# Patient Record
Sex: FEMALE | Race: WHITE | Employment: FULL TIME | URBAN - METROPOLITAN AREA
[De-identification: names, ages, dates, MRNs, and addresses within clinical notes are randomized per-mention and may not be internally consistent; named-entity substitution may affect disease eponyms.]

---

## 2017-06-08 PROBLEM — M79.671 RIGHT FOOT PAIN: Status: ACTIVE | Noted: 2017-06-08

## 2017-09-18 PROBLEM — J30.1 CHRONIC ALLERGIC RHINITIS DUE TO POLLEN, UNSPECIFIED SEASONALITY: Status: ACTIVE | Noted: 2017-09-18

## 2018-02-22 PROBLEM — Z01.419 ENCOUNTER FOR WELL WOMAN EXAM: Status: ACTIVE | Noted: 2018-02-22

## 2018-02-22 PROCEDURE — 87660 TRICHOMONAS VAGIN DIR PROBE: CPT | Performed by: OBSTETRICS & GYNECOLOGY

## 2018-02-22 PROCEDURE — 87510 GARDNER VAG DNA DIR PROBE: CPT | Performed by: OBSTETRICS & GYNECOLOGY

## 2018-02-22 PROCEDURE — 88175 CYTOPATH C/V AUTO FLUID REDO: CPT | Performed by: OBSTETRICS & GYNECOLOGY

## 2018-02-22 PROCEDURE — 87480 CANDIDA DNA DIR PROBE: CPT | Performed by: OBSTETRICS & GYNECOLOGY

## 2018-02-22 PROCEDURE — 87624 HPV HI-RISK TYP POOLED RSLT: CPT | Performed by: OBSTETRICS & GYNECOLOGY

## 2018-02-22 PROCEDURE — 88304 TISSUE EXAM BY PATHOLOGIST: CPT | Performed by: OBSTETRICS & GYNECOLOGY

## 2018-02-25 ENCOUNTER — HOSPITAL ENCOUNTER (EMERGENCY)
Age: 28
Discharge: HOME | End: 2018-02-25
Attending: EMERGENCY MEDICINE
Payer: COMMERCIAL

## 2018-02-25 VITALS
HEIGHT: 69 IN | OXYGEN SATURATION: 99 % | WEIGHT: 155 LBS | RESPIRATION RATE: 20 BRPM | HEART RATE: 99 BPM | BODY MASS INDEX: 22.96 KG/M2 | DIASTOLIC BLOOD PRESSURE: 76 MMHG | SYSTOLIC BLOOD PRESSURE: 128 MMHG | TEMPERATURE: 100 F

## 2018-02-25 DIAGNOSIS — Z48.89 ENCOUNTER FOR POST SURGICAL WOUND CHECK: Primary | ICD-10-CM

## 2018-02-25 PROCEDURE — 99283 EMERGENCY DEPT VISIT LOW MDM: CPT

## 2018-02-25 NOTE — ED PROVIDER NOTES
Patient Seen in: [facility] Emergency Department In [place]    History   Patient presents with:  Eval-G (gynecologic)    Stated Complaint: eval g    HPI    46-year-old female comes the hospital she complained having discomfort over her labia majora.   She cyanosis or edema noted. Full range of motion noted without tenderness  Genitalia postsurgical healing granulating tissue noted without erythema, pus or drainage.   Is no significant tenderness at this time there is no sign of infection    ED Course   Labs

## 2018-02-25 NOTE — ED INITIAL ASSESSMENT (HPI)
Skin tags removed from labia on Thursday. Yesterday with pain to site with redness and bleeding. Pt concerned for infection. On flagly post-procedure. Denies fevers.

## 2018-07-18 PROCEDURE — 81003 URINALYSIS AUTO W/O SCOPE: CPT | Performed by: FAMILY MEDICINE

## 2018-07-18 PROCEDURE — 87329 GIARDIA AG IA: CPT | Performed by: FAMILY MEDICINE

## 2018-07-18 PROCEDURE — 87046 STOOL CULTR AEROBIC BACT EA: CPT | Performed by: FAMILY MEDICINE

## 2018-07-18 PROCEDURE — 87209 SMEAR COMPLEX STAIN: CPT | Performed by: FAMILY MEDICINE

## 2018-07-18 PROCEDURE — 87045 FECES CULTURE AEROBIC BACT: CPT | Performed by: FAMILY MEDICINE

## 2018-07-18 PROCEDURE — 87272 CRYPTOSPORIDIUM AG IF: CPT | Performed by: FAMILY MEDICINE

## 2018-07-18 PROCEDURE — 87077 CULTURE AEROBIC IDENTIFY: CPT | Performed by: FAMILY MEDICINE

## 2018-07-18 PROCEDURE — 87177 OVA AND PARASITES SMEARS: CPT | Performed by: FAMILY MEDICINE

## 2018-07-24 PROBLEM — K62.5 RECTAL BLEEDING: Status: ACTIVE | Noted: 2018-07-24

## 2018-10-22 ENCOUNTER — HOSPITAL ENCOUNTER (OUTPATIENT)
Facility: HOSPITAL | Age: 28
Setting detail: HOSPITAL OUTPATIENT SURGERY
Discharge: HOME | End: 2018-10-22
Attending: INTERNAL MEDICINE | Admitting: INTERNAL MEDICINE
Payer: COMMERCIAL

## 2018-10-22 ENCOUNTER — ANESTHESIA EVENT (OUTPATIENT)
Dept: ENDOSCOPY | Facility: HOSPITAL | Age: 28
End: 2018-10-22
Payer: COMMERCIAL

## 2018-10-22 ENCOUNTER — ANESTHESIA (OUTPATIENT)
Dept: ENDOSCOPY | Facility: HOSPITAL | Age: 28
End: 2018-10-22
Payer: COMMERCIAL

## 2018-10-22 VITALS
HEART RATE: 60 BPM | HEIGHT: 69 IN | TEMPERATURE: 98 F | WEIGHT: 143 LBS | RESPIRATION RATE: 16 BRPM | SYSTOLIC BLOOD PRESSURE: 118 MMHG | OXYGEN SATURATION: 100 % | DIASTOLIC BLOOD PRESSURE: 89 MMHG | BODY MASS INDEX: 21.18 KG/M2

## 2018-10-22 PROCEDURE — 0DB98ZX EXCISION OF DUODENUM, VIA NATURAL OR ARTIFICIAL OPENING ENDOSCOPIC, DIAGNOSTIC: ICD-10-PCS | Performed by: INTERNAL MEDICINE

## 2018-10-22 PROCEDURE — 81025 URINE PREGNANCY TEST: CPT | Performed by: INTERNAL MEDICINE

## 2018-10-22 PROCEDURE — 0DB68ZX EXCISION OF STOMACH, VIA NATURAL OR ARTIFICIAL OPENING ENDOSCOPIC, DIAGNOSTIC: ICD-10-PCS | Performed by: INTERNAL MEDICINE

## 2018-10-22 PROCEDURE — 88305 TISSUE EXAM BY PATHOLOGIST: CPT | Performed by: INTERNAL MEDICINE

## 2018-10-22 PROCEDURE — 0DBN8ZZ EXCISION OF SIGMOID COLON, VIA NATURAL OR ARTIFICIAL OPENING ENDOSCOPIC: ICD-10-PCS | Performed by: INTERNAL MEDICINE

## 2018-10-22 RX ORDER — SODIUM CHLORIDE, SODIUM LACTATE, POTASSIUM CHLORIDE, CALCIUM CHLORIDE 600; 310; 30; 20 MG/100ML; MG/100ML; MG/100ML; MG/100ML
INJECTION, SOLUTION INTRAVENOUS CONTINUOUS
Status: DISCONTINUED | OUTPATIENT
Start: 2018-10-22 | End: 2018-10-22

## 2018-10-22 RX ORDER — SODIUM CHLORIDE, SODIUM LACTATE, POTASSIUM CHLORIDE, CALCIUM CHLORIDE 600; 310; 30; 20 MG/100ML; MG/100ML; MG/100ML; MG/100ML
INJECTION, SOLUTION INTRAVENOUS CONTINUOUS
Status: CANCELLED | OUTPATIENT
Start: 2018-10-22

## 2018-10-22 RX ORDER — NALOXONE HYDROCHLORIDE 0.4 MG/ML
80 INJECTION, SOLUTION INTRAMUSCULAR; INTRAVENOUS; SUBCUTANEOUS AS NEEDED
Status: CANCELLED | OUTPATIENT
Start: 2018-10-22 | End: 2018-10-23

## 2018-10-22 NOTE — ANESTHESIA PREPROCEDURE EVALUATION
PRE-OP EVALUATION    Patient Name: Saturnino Talbot    Pre-op Diagnosis: ABDOMINAL PAIN,IRON DEFICIENCY ANEMIA,RECTAL BLEEDING    Procedure(s):  ESOPHAGOGASTRODUODENOSCOPY AND COLONOSCOPY      Surgeon(s) and Role:     * Derrick Perry MD - Primary    P patient. Risks/benefits explained. Agrees to proceed. Consent signed.   Plan/risks discussed with: patient                Present on Admission:  **None**

## 2018-10-22 NOTE — ANESTHESIA POSTPROCEDURE EVALUATION
Aileen [address] Patient Status:  Hospital Outpatient Surgery   Age/Gender 34year old female MRN [id number]   Location [facility] Attending Rea Márquez MD   Hosp Day # 0 PCP Chapis Vera MD       Anesthesia Post-

## 2018-10-22 NOTE — H&P
HISTORY AND PHYSICAL FOR ENDOSCOPIC PROCEDURE      Jarad Beckford Patient Status:  Hospital Outpatient Surgery    10/8/1990 MRN [id number]   [facility] ENDOSCOPY Attending Wong Everett MD   Hosp Day # 0 PCP Arti Zimmerman MD history of stroke, recent passing out, recent dizziness, convulsions/seizures, dementia.   Cardiovascular: Denies history of heart murmur, leg swelling, history of rheumatic fever, pacemaker, chest pain or pressure after eating or when upset, automatic defi No neck vein distention. Thyroid not enlarged. No lymphadenopathy. CV: S1 and S2 normal.  No murmurs or gallops. Lungs: Clear to auscultation. Abdomen: Soft and nondistended. Nontender. No masses. Bowel sounds are present. Back: No CVA tenderness. alternatives were discussed with the patient. The patient indicates understanding of these issues and agrees to proceed with the colonoscopy. The patient is an ASA grade 2.       Deidre Luong  10/22/2018  4:18 PM

## 2018-10-25 NOTE — PROGRESS NOTES
10/25/2018  Colt Austinfilippo  [address] [facility] [id number]    Dear Tae Monson,       Here are the biopsy/pathology findings from your recent EGD (upper endoscopy) and colonoscopy:     The biopsy/pathology findings from your upper endoscopy showed:

## 2019-01-21 PROBLEM — K62.5 RECTAL BLEEDING: Status: RESOLVED | Noted: 2018-07-24 | Resolved: 2019-01-21

## 2019-01-21 PROBLEM — R00.2 PALPITATIONS: Status: ACTIVE | Noted: 2019-01-21

## 2019-01-21 PROBLEM — M79.671 RIGHT FOOT PAIN: Status: RESOLVED | Noted: 2017-06-08 | Resolved: 2019-01-21

## 2019-01-21 PROBLEM — R42 DIZZINESS: Status: ACTIVE | Noted: 2019-01-21

## 2019-03-19 PROBLEM — J30.1 CHRONIC ALLERGIC RHINITIS DUE TO POLLEN: Status: ACTIVE | Noted: 2017-09-18

## 2019-03-19 PROBLEM — E61.1 IRON DEFICIENCY: Status: ACTIVE | Noted: 2019-03-19

## 2019-03-19 PROBLEM — E55.9 VITAMIN D DEFICIENCY: Status: ACTIVE | Noted: 2019-03-19

## 2019-03-19 PROBLEM — F41.9 ANXIETY: Status: ACTIVE | Noted: 2019-03-19

## 2019-04-11 PROBLEM — R26.89 IMBALANCE: Status: ACTIVE | Noted: 2019-04-11

## 2019-04-16 PROBLEM — F41.1 GAD (GENERALIZED ANXIETY DISORDER): Status: ACTIVE | Noted: 2019-04-16

## 2019-04-16 PROBLEM — R26.89 IMBALANCE: Status: RESOLVED | Noted: 2019-04-11 | Resolved: 2019-04-16

## 2019-04-16 PROBLEM — E23.6 EMPTY SELLA SYNDROME (HCC): Status: ACTIVE | Noted: 2019-04-16

## 2019-04-16 PROBLEM — R51.9 HEADACHE DISORDER: Status: ACTIVE | Noted: 2019-04-16

## 2019-05-07 PROCEDURE — 86803 HEPATITIS C AB TEST: CPT | Performed by: OBSTETRICS & GYNECOLOGY

## 2019-05-07 PROCEDURE — 87389 HIV-1 AG W/HIV-1&-2 AB AG IA: CPT | Performed by: OBSTETRICS & GYNECOLOGY

## 2019-05-07 PROCEDURE — 36415 COLL VENOUS BLD VENIPUNCTURE: CPT | Performed by: OBSTETRICS & GYNECOLOGY

## 2019-05-07 PROCEDURE — 88175 CYTOPATH C/V AUTO FLUID REDO: CPT | Performed by: OBSTETRICS & GYNECOLOGY

## 2019-06-03 PROBLEM — R00.2 PALPITATIONS: Status: RESOLVED | Noted: 2019-01-21 | Resolved: 2019-06-03

## 2019-07-03 PROBLEM — R42 DIZZINESS: Status: RESOLVED | Noted: 2019-01-21 | Resolved: 2019-07-03

## 2019-07-15 PROBLEM — F50.9 EATING DISORDER, UNSPECIFIED: Status: ACTIVE | Noted: 2019-07-15

## 2019-07-15 PROBLEM — F43.10 PTSD (POST-TRAUMATIC STRESS DISORDER): Status: ACTIVE | Noted: 2019-07-15

## 2019-07-15 PROBLEM — F33.2 SEVERE RECURRENT MAJOR DEPRESSION WITHOUT PSYCHOTIC FEATURES (HCC): Status: ACTIVE | Noted: 2019-07-15

## 2019-07-18 ENCOUNTER — LAB ENCOUNTER (OUTPATIENT)
Dept: LAB | Age: 29
End: 2019-07-18
Attending: Other
Payer: COMMERCIAL

## 2019-07-18 DIAGNOSIS — Z00.00 EXAMINATION: Primary | ICD-10-CM

## 2019-07-18 PROCEDURE — 82150 ASSAY OF AMYLASE: CPT

## 2019-07-18 PROCEDURE — 86803 HEPATITIS C AB TEST: CPT | Performed by: OBSTETRICS & GYNECOLOGY

## 2019-07-18 PROCEDURE — 87389 HIV-1 AG W/HIV-1&-2 AB AG IA: CPT | Performed by: OBSTETRICS & GYNECOLOGY

## 2019-07-18 PROCEDURE — 80053 COMPREHEN METABOLIC PANEL: CPT

## 2019-07-18 PROCEDURE — 84443 ASSAY THYROID STIM HORMONE: CPT

## 2019-07-18 PROCEDURE — 84480 ASSAY TRIIODOTHYRONINE (T3): CPT

## 2019-07-18 PROCEDURE — 83735 ASSAY OF MAGNESIUM: CPT

## 2019-07-18 PROCEDURE — 84436 ASSAY OF TOTAL THYROXINE: CPT

## 2019-07-18 PROCEDURE — 84100 ASSAY OF PHOSPHORUS: CPT

## 2019-07-19 LAB
ALBUMIN SERPL-MCNC: 3.9 G/DL (ref 3.4–5)
ALBUMIN/GLOB SERPL: 1 {RATIO} (ref 1–2)
ALP LIVER SERPL-CCNC: 50 U/L (ref 37–98)
ALT SERPL-CCNC: 29 U/L (ref 13–56)
AMYLASE SERPL-CCNC: 107 U/L (ref 25–115)
ANION GAP SERPL CALC-SCNC: 6 MMOL/L (ref 0–18)
AST SERPL-CCNC: 21 U/L (ref 15–37)
BILIRUB SERPL-MCNC: 0.3 MG/DL (ref 0.1–2)
BUN BLD-MCNC: 17 MG/DL (ref 7–18)
BUN/CREAT SERPL: 18.7 (ref 10–20)
CALCIUM BLD-MCNC: 9.6 MG/DL (ref 8.5–10.1)
CHLORIDE SERPL-SCNC: 105 MMOL/L (ref 98–112)
CO2 SERPL-SCNC: 30 MMOL/L (ref 21–32)
CREAT BLD-MCNC: 0.91 MG/DL (ref 0.55–1.02)
GLOBULIN PLAS-MCNC: 3.9 G/DL (ref 2.8–4.4)
GLUCOSE BLD-MCNC: 72 MG/DL (ref 70–99)
HAV IGM SER QL: 2.2 MG/DL (ref 1.6–2.6)
M PROTEIN MFR SERPL ELPH: 7.8 G/DL (ref 6.4–8.2)
OSMOLALITY SERPL CALC.SUM OF ELEC: 292 MOSM/KG (ref 275–295)
PHOSPHATE SERPL-MCNC: 3.6 MG/DL (ref 2.5–4.9)
POTASSIUM SERPL-SCNC: 3.9 MMOL/L (ref 3.5–5.1)
SODIUM SERPL-SCNC: 141 MMOL/L (ref 136–145)
T3 SERPL-MCNC: 100 NG/DL (ref 60–181)
T4 SERPL-MCNC: 12.1 UG/DL (ref 4.8–13.9)
TSI SER-ACNC: 3.36 MIU/ML (ref 0.36–3.74)

## 2019-10-16 PROBLEM — F33.9 MAJOR DEPRESSION, RECURRENT (HCC): Status: ACTIVE | Noted: 2019-10-16

## 2020-07-28 PROBLEM — Z30.8 ENCOUNTER FOR OTHER CONTRACEPTIVE MANAGEMENT: Status: ACTIVE | Noted: 2020-07-28

## 2020-07-28 PROBLEM — S32.10XA CLOSED FRACTURE OF SACRUM AND COCCYX, INITIAL ENCOUNTER (HCC): Status: ACTIVE | Noted: 2020-07-28

## 2020-07-28 PROBLEM — S32.2XXA CLOSED FRACTURE OF SACRUM AND COCCYX, INITIAL ENCOUNTER (HCC): Status: ACTIVE | Noted: 2020-07-28

## 2020-08-18 PROBLEM — M25.512 ACUTE PAIN OF LEFT SHOULDER: Status: ACTIVE | Noted: 2020-08-18

## 2020-08-18 PROBLEM — F33.2 SEVERE RECURRENT MAJOR DEPRESSION WITHOUT PSYCHOTIC FEATURES (HCC): Status: RESOLVED | Noted: 2019-07-15 | Resolved: 2020-08-18

## 2020-09-11 PROCEDURE — 88302 TISSUE EXAM BY PATHOLOGIST: CPT | Performed by: OBSTETRICS & GYNECOLOGY

## 2021-04-09 DIAGNOSIS — Z23 NEED FOR VACCINATION: ICD-10-CM

## 2021-12-21 ENCOUNTER — TELEPHONE (OUTPATIENT)
Dept: FAMILY MEDICINE CLINIC | Facility: CLINIC | Age: 31
End: 2021-12-21

## 2022-01-21 PROBLEM — R09.81 SINUS CONGESTION: Status: ACTIVE | Noted: 2022-01-21

## 2022-01-21 PROBLEM — R06.02 SOB (SHORTNESS OF BREATH) ON EXERTION: Status: ACTIVE | Noted: 2022-01-21

## 2022-01-21 PROBLEM — H04.203 WATERY EYES: Status: ACTIVE | Noted: 2022-01-21

## 2022-05-04 NOTE — OPERATIVE REPORT
[facility]                                                                                           EGD/Colonoscopy Operative Report    Fernanda Maradiaga Patient Status:  Yusra Tellez [facility]        NAME: [place] is a 60 y o  female  : 1958    MRN: [id number]  DATE: May 4, 2022  TIME: 12:07 PM    Assessment and Plan   Ear congestion, left [H93 8X2]  1  Ear congestion, left  fluticasone (FLONASE) 50 mcg/act nasal spray   2  Acute otitis externa of right ear, unspecified type  neomycin-polymyxin-hydrocortisone (CORTISPORIN) otic solution         Patient Instructions       Follow up with PCP in 3-5 days  Proceed to  ER if symptoms worsen or for any fevers or swelling behind the ear     Chief Complaint     Chief Complaint   Patient presents with    Earache     Right ear pain; left ear clogged; started last night  not taking OTC         History of Present Illness       Patient is a 59 yo female who presents with a couple of days of left ear congestion and right ear pain  She reports some associated diminished hearing in the left ear  She denies drainage from the right eat and swelling behind either ear  She also denies fevers and any nasal congestion or cough  Review of Systems   Review of Systems   Constitutional: Negative for chills and fever  HENT: Positive for ear pain and hearing loss  Negative for congestion, ear discharge, facial swelling and tinnitus  Respiratory: Negative for shortness of breath  Cardiovascular: Negative for chest pain           Current Medications       Current Outpatient Medications:     ascorbic acid (VITAMIN C) 250 mg tablet, Take 250 mg by mouth daily, Disp: , Rfl:     aspirin (ECOTRIN LOW STRENGTH) 81 mg EC tablet, Take 81 mg by mouth daily, Disp: , Rfl:     Vitamin D, Cholecalciferol, 400 units TABS, Take by mouth, Disp: , Rfl:     Zinc Sulfate (ZINC 15 PO), Take by mouth, Disp: , Rfl:     Aloe Vera 25 MG CAPS, Take 50 mg by mouth (Patient not taking: Reported on 2021), Disp: , Rfl:     B COMPLEX-BIOTIN-FA ER PO, Take by mouth (Patient not taking: Reported on 2021), Disp: , Rfl:     Calcium Citrate 200 MG TABS, Take 400 mg by mouth (Patient not taking: Reported on 9/23/2021), Disp: , Rfl:     Chromium Picolinate 200 MCG CAPS, Take by mouth (Patient not taking: Reported on 9/23/2021), Disp: , Rfl:     Cinnamon Oil OIL, by Does not apply route (Patient not taking: Reported on 9/23/2021), Disp: , Rfl:     cyanocobalamin (VITAMIN B-12) 1,000 mcg tablet, Take 1,000 mcg by mouth (Patient not taking: Reported on 9/23/2021), Disp: , Rfl:     fluticasone (FLONASE) 50 mcg/act nasal spray, 1 spray into each nostril daily, Disp: 11 1 mL, Rfl: 0    Ginkgo Biloba 40 MG TABS, Take by mouth (Patient not taking: Reported on 9/23/2021), Disp: , Rfl:     glucosamine-chondroitin 500-400 MG tablet, Take 1 tablet by mouth 3 (three) times a day (Patient not taking: Reported on 9/23/2021), Disp: , Rfl:     methocarbamol (ROBAXIN) 500 mg tablet, Take 1 tablet (500 mg total) by mouth 4 (four) times a day (Patient not taking: Reported on 9/23/2021), Disp: 40 tablet, Rfl: 0    neomycin-polymyxin-hydrocortisone (CORTISPORIN) otic solution, Administer 4 drops to the right ear every 6 (six) hours, Disp: 10 mL, Rfl: 0    NON FORMULARY, , Disp: , Rfl:     nystatin (MYCOSTATIN) 500,000 units/5 mL suspension, Apply 5 mL (500,000 Units total) to the mouth or throat 4 (four) times a day (Patient not taking: Reported on 9/23/2021), Disp: 280 mL, Rfl: 1    patient supplied medication, , Disp: , Rfl:     patient supplied medication, , Disp: , Rfl:     patient supplied medication, , Disp: , Rfl:     Riboflavin (B2 PO), Take by mouth (Patient not taking: Reported on 9/23/2021), Disp: , Rfl:     TURMERIC PO, Take by mouth (Patient not taking: Reported on 9/23/2021), Disp: , Rfl:     Current Allergies     Allergies as of 05/04/2022 - Reviewed 05/04/2022   Allergen Reaction Noted    Albuterol  11/15/2018    Bee venom Anaphylaxis 05/07/2018    Penicillins Anaphylaxis 11/15/2018            The following portions of the patient's history were reviewed withdrawn to examine the esophagus. The endoscope was then removed from the patient. The patient tolerated the procedure well with no immediate complications. The patient was then repositioned for colonoscopy.   After careful digital rectal examination, the and updated as appropriate: allergies, current medications, past family history, past medical history, past social history, past surgical history and problem list      Past Medical History:   Diagnosis Date    Abnormal Pap smear of cervix     Cancer ([facility] [address] ) 12/2018    oral    Hearing loss     down 50% in right ear    Kidney stone     6 or 7 yrs ago    Lumbar herniated disc     Migraine     Neck pain     Prediabetes     Primary squamous cell carcinoma of tongue ([facility] [address] ) 3/8/2019    Varicella     Wears glasses        Past Surgical History:   Procedure Laterality Date    HYSTERECTOMY  2007    OOPHORECTOMY Bilateral 2007    AR EXCIS TONGUE LESN N/A 1/9/2019    Procedure: Partial glossectomy with FROZEN SECTION;  Surgeon: Doron Hughes DO;  Location: [facility];  Service: ENT    AR EXCIS TONGUE LESN Right 3/8/2019    Procedure: LATERAL TONGUE SURGICAL SITE RE-EXCISION WITH FROZEN SECTION ;  Surgeon: Doron Hughes DO;  Location[facility];  Service: ENT    AR REMOVAL NODES, NECK,CERV MOD RAD Bilateral 3/8/2019    Procedure: SUPRAOMOHYOID NECK DISSECTION;  Surgeon: Doron Hughes DO;  Location[facility];  Service: ENT    TONSILLECTOMY      TUBAL LIGATION      WISDOM TOOTH EXTRACTION         Family History   Problem Relation Age of Onset    Hypertension Mother     Diabetes Father     Hypertension Father     No Known Problems Daughter     No Known Problems Maternal Grandmother     No Known Problems Paternal Grandmother     No Known Problems Paternal Aunt     No Known Problems Paternal Aunt     Breast cancer Neg Hx     Endometrial cancer Neg Hx     Ovarian cancer Neg Hx          Medications have been verified  Objective   /74   Pulse 71   Temp 97 5 °F (36 4 °C)   Resp 16   SpO2 99%        Physical Exam     Physical Exam  Constitutional:       Appearance: Normal appearance  HENT:      Right Ear: Tympanic membrane and external ear normal  There is no impacted cerumen  Left Ear: Tympanic membrane and external ear normal  There is no impacted cerumen  Ears:      Comments: Right EAC mildly edematous and erythematous  Fluid and congestion of left EAC      Nose: No congestion  Mouth/Throat:      Mouth: Mucous membranes are moist    Cardiovascular:      Rate and Rhythm: Normal rate  Pulmonary:      Effort: Pulmonary effort is normal    Skin:     General: Skin is warm and dry  Neurological:      Mental Status: She is alert     Psychiatric:         Mood and Affect: Mood normal          Behavior: Behavior normal

## 2023-03-10 NOTE — ED INITIAL ASSESSMENT (HPI)
Hit her head on the corner of a wall yesterday. No LOC. Patient feels a divot to skull today and c/o headache.

## 2023-03-10 NOTE — ED QUICK NOTES
Spoke with MD. States no longer wants CT scan but reports unable to discontinue imaging. Pt left room prior to receiving discharge papers.

## 2023-03-10 NOTE — DISCHARGE INSTRUCTIONS
Take 1000 mg acetaminophen (2 Tylenol tablets) and/or 600 mg ibuprofen (3 Advil tablets) every 6 hours as needed for pain or fever.     Apply ice intermittently to the painful area for the next 24 hours

## 2024-09-30 NOTE — PROGRESS NOTES
Chief Complaint   Patient presents with    Ear Problem     need ear wax removed, left ear is completely blocked - Entered by patient  Lft ear clogged since AM.  No improvement with otc drops.         HPI:   Suellen Mendoza is a 33 year old adult who presents for left ear impaction.   Reports diminished hearing, itching.  Denies ear pain, fever, chills, or nasal congestion.   Home treatments tried: Q tip. Has had issue with excessive cerumen in the past.     Current Outpatient Medications   Medication Sig Dispense Refill    ergocalciferol 1.25 MG (54454 UT) Oral Cap Take 1 capsule (50,000 Units total) by mouth once a week.      fluticasone-salmeterol (ADVAIR HFA) 115-21 MCG/ACT Inhalation Aerosol Inhale 1 puff into the lungs 2 (two) times daily. (Patient not taking: Reported on 3/9/2023) 3 Inhaler 1    Sertraline HCl 100 MG Oral Tab Take 2 tablets (200 mg total) by mouth daily. (Patient not taking: Reported on 3/9/2023) 60 tablet 0    DAILY MULTIPLE VITAMINS/IRON Oral Tab Take 1 tablet by mouth daily.      Albuterol Sulfate HFA (PROAIR HFA) 108 (90 Base) MCG/ACT Inhalation Aero Soln Inhale 2 puffs into the lungs every 6 (six) hours as needed for Wheezing. (Patient not taking: Reported on 3/9/2023) 8 g 0    Fluticasone Propionate 50 MCG/ACT Nasal Suspension 2 sprays by Each Nare route daily. 1 Bottle 2      Past Medical History:    Anxiety state, unspecified    Asthma (HCC)    Benign positional vertigo    Depression    Eustachian tube dysfunction    Iron deficiency    Rhinitis, allergic    Severe recurrent major depression without psychotic features (HCC)    Syncope and collapse    Vitamin D deficiency      Past Surgical History:   Procedure Laterality Date    Colonoscopy N/A 10/22/2018    Procedure: COLONOSCOPY;  Surgeon: Quincy Calvin MD;  Location:  ENDOSCOPY      Family History   Problem Relation Age of Onset    No Known Problems Father     Lipids Mother     Hypertension Mother     Anxiety Mother     Bipolar  Disorder Brother     Suicide History Maternal Grandfather         death by suicide 4 yrs ago    Endometriosis Other       Social History     Socioeconomic History    Marital status: Single   Tobacco Use    Smoking status: Never    Smokeless tobacco: Never   Vaping Use    Vaping status: Never Used   Substance and Sexual Activity    Alcohol use: Not Currently     Alcohol/week: 0.0 standard drinks of alcohol     Comment: last drink: March 2019    Drug use: Not Currently     Types: Cannabis, Cocaine, Nitrous oxide, LSD, Ecstasy     Comment: last use for all: March 2019    Sexual activity: Not Currently         REVIEW OF SYSTEMS:   GENERAL: Feeling well otherwise.    HEENT: See HPI  LUNGS: No cough, shortness of breath, or wheezing.  CARDIOVASCULAR: No chest pain, palpitations  NEURO: denies headaches or dizziness    EXAM:   GENERAL: well developed, well nourished, in no apparent distress  HEAD: atraumatic, normocephalic  EYES: conjunctiva clear  EARS: Prior to cerumen removal, left EAC with cerumen impaction and left TM not visible. Tragus non tender on palpation bilaterally.  After cerumen removal: TMs healthy, no injection, fluid, retraction. EACs healthy and without lesions.     Cerumen Removal Procedure  Patient gave verbal consent.   Risks and Benefits of removal were discussed with the patient. Pt agreed to proceed with procedure.  Location: left ear  Indication: TM not visible, local itching, fullness.  Prep: Hydrogen Peroxide with warm water via syringe  Removal: Otoscope visualization of cerumen and irrigation was used to remove the wax  Patient Status: Tolerated Well; No complications        Assessment:    Encounter Diagnosis   Name Primary?    Left ear impacted cerumen Yes       Plan:   Successful removal with irrigation. Patient tolerated well without complications  Discussed future prevention.   F/U as needed.    [unfilled]

## 2024-11-17 NOTE — ED PROVIDER NOTES
Patient Seen in: [facility] In [place]      History     Chief Complaint   Patient presents with    Ankle Injury     Stated Complaint: left ankle injury - 2 weeks ago    Subjective:   HPI      34-year-old female with anxiety/depression, asthma presents today with complaints of ankle injury that occurred 2 weeks ago.  Patient denies any pain in this ankle but states it it is still swollen and slightly bruised.  Patient denies any history of trauma prior to the 2 weeks ago.    Objective:     Past Medical History:    Anxiety state, unspecified    Asthma (HCC)    Benign positional vertigo    Depression    Eustachian tube dysfunction    Iron deficiency    Rhinitis, allergic    Severe recurrent major depression without psychotic features (HCC)    Syncope and collapse    Vitamin D deficiency              Past Surgical History:   Procedure Laterality Date    Colonoscopy N/A 10/22/2018    Procedure: COLONOSCOPY;  Surgeon: Quincy Calvin MD;  Location:  ENDOSCOPY                Social History     Socioeconomic History    Marital status: Single   Tobacco Use    Smoking status: Never    Smokeless tobacco: Never   Vaping Use    Vaping status: Never Used   Substance and Sexual Activity    Alcohol use: Not Currently     Comment: weekly    Drug use: Not Currently     Types: Cannabis, Cocaine, Nitrous oxide, LSD, Ecstasy     Comment: last use for all: March 2019    Sexual activity: Not Currently                  Physical Exam     ED Triage Vitals [11/17/24 1244]   /89   Pulse 91   Resp 16   Temp 98.1 °F (36.7 °C)   Temp src Temporal   SpO2 99 %   O2 Device None (Room air)       Current Vitals:   Vital Signs  BP: 130/89  Pulse: 91  Resp: 16  Temp: 98.1 °F (36.7 °C)  Temp src: Temporal    Oxygen Therapy  SpO2: 99 %  O2 Device: None (Room air)        Physical Exam  Vitals and nursing note reviewed.   Constitutional:       Appearance: Normal appearance. Rowen is normal weight.   HENT:      Head: Normocephalic.       Right Ear: External ear normal.      Left Ear: External ear normal.   Eyes:      Extraocular Movements: Extraocular movements intact.      Conjunctiva/sclera: Conjunctivae normal.      Pupils: Pupils are equal, round, and reactive to light.   Cardiovascular:      Rate and Rhythm: Normal rate and regular rhythm.      Pulses: Normal pulses.      Heart sounds: Normal heart sounds.   Pulmonary:      Effort: Pulmonary effort is normal.   Musculoskeletal:         General: Swelling and signs of injury present. Normal range of motion.      Cervical back: Normal range of motion and neck supple.      Comments: Ecchymosis appreciated inferior to the left lateral malleolus.  Achilles tendon intact.   Neurological:      Mental Status: Suellen is alert.   Psychiatric:         Mood and Affect: Mood normal.           ED Course   Labs Reviewed - No data to display                MDM      34-year-old female with anxiety/depression, asthma presents today with complaints of ankle injury that occurred 2 weeks ago.  Patient denies any pain in this ankle but states it it is still swollen and slightly bruised.  Patient denies any history of trauma prior to the 2 weeks ago.  Vital signs: Please see EMR.  Physical exam: Please see exam.  Differential diagnosis: Ankle sprain, contusion, fracture.  I have personally reviewed the x-ray of the ankle and no acute abnormalities appreciated.  Awaiting formal radiology read.  XR ANKLE (MIN 3 VIEWS), LEFT (CPT=73610)    Result Date: 11/17/2024  CONCLUSION:  No acute osseous findings.   LOCATION:  [facility]   Dictated by (CST): Ha Reyes MD on 11/17/2024 at 1:44 PM     Finalized by (CST): Ha Reyes MD on 11/17/2024 at 1:44 PM        Based on physical exam and HPI will diagnosed with ankle sprain.  Patient originally injured her ankle 2 weeks ago.  Will place patient in an Ace bandage and have her follow-up with podiatrist as needed.      Medical Decision Making  34-year-old female with  anxiety/depression, asthma presents today with complaints of ankle injury that occurred 2 weeks ago.  Patient denies any pain in this ankle but states it it is still swollen and slightly bruised.  Patient denies any history of trauma prior to the 2 weeks ago.    Problems Addressed:  Mild sprain of left ankle, initial encounter: acute illness or injury    Amount and/or Complexity of Data Reviewed  Radiology: ordered. Decision-making details documented in ED Course.    Risk  OTC drugs.  Prescription drug management.        Disposition and Plan     Clinical Impression:  1. Mild sprain of left ankle, initial encounter         Disposition:  Discharge  11/17/2024  1:46 pm    Follow-up:  Davis Max MD  [address]  [facility] [address]  [phone]    Follow up in 1 week(s)  As needed    Jeison Yi DPM  [address] [place] [address] [address] [facility] [address]  [phone]    Follow up in 1 week(s)  If symptoms worsen          Medications Prescribed:  Current Discharge Medication List        START taking these medications    Details   naproxen 500 MG Oral Tab Take 1 tablet (500 mg total) by mouth 2 (two) times daily as needed.  Qty: 20 tablet, Refills: 0                 Supplementary Documentation:

## 2024-11-26 NOTE — PROGRESS NOTES
[facility]  Progress Note  11/26/2024    Suellen Mendoza is a 34 year old adult.   Chief Complaint   Patient presents with    New Patient     A few weeks back patient had sprained her left ankle. She was seen in ED on 11/17/23, had xrays completed. Patient still has some discomfort with certain movements. She rates pain 2/10 in the office, does notice tingling at times, denies any numbness.         HPI:     Patient is a pleasant 34-year-old who is presenting to clinic today with a left ankle injury.  Patient states that a few weeks ago they were walking with her significant other, and rolled their left ankle.  Patient is still having some discomfort, more so on the outside of the ankle.  She states that she is experiencing more stiffness than anything.  Rates the pain 2/10 today in the office.  Continues to experience some swelling to the outside the ankle.  She has been utilizing a compression wrap, which has been beneficial.  Does notice occasional tingling but is denying any numbness.  Patient is here today for further evaluation and care.  Denies any other concerns.      Allergies: Seasonal   Current Outpatient Medications   Medication Sig Dispense Refill    ergocalciferol 1.25 MG (19088 UT) Oral Cap Take 1 capsule (50,000 Units total) by mouth once a week.      fluticasone-salmeterol (ADVAIR HFA) 115-21 MCG/ACT Inhalation Aerosol Inhale 1 puff into the lungs 2 (two) times daily. 3 Inhaler 1    Sertraline HCl 100 MG Oral Tab Take 2 tablets (200 mg total) by mouth daily. 60 tablet 0    DAILY MULTIPLE VITAMINS/IRON Oral Tab Take 1 tablet by mouth daily.      Albuterol Sulfate HFA (PROAIR HFA) 108 (90 Base) MCG/ACT Inhalation Aero Soln Inhale 2 puffs into the lungs every 6 (six) hours as needed for Wheezing. 8 g 0    Fluticasone Propionate 50 MCG/ACT Nasal Suspension 2 sprays by Each Nare route daily. 1 Bottle 2      Past Medical History:    Anxiety state, unspecified    Asthma (HCC)    Benign  positional vertigo    Depression    Eustachian tube dysfunction    Iron deficiency    Rhinitis, allergic    Severe recurrent major depression without psychotic features (HCC)    Syncope and collapse    Vitamin D deficiency      Past Surgical History:   Procedure Laterality Date    Colonoscopy N/A 10/22/2018    Procedure: COLONOSCOPY;  Surgeon: Quincy Calvin MD;  Location:  ENDOSCOPY      Family History   Problem Relation Age of Onset    No Known Problems Father     Lipids Mother     Hypertension Mother     Anxiety Mother     Bipolar Disorder Brother     Suicide History Maternal Grandfather         death by suicide 4 yrs ago    Endometriosis Other       Social History     Socioeconomic History    Marital status: Single   Tobacco Use    Smoking status: Never    Smokeless tobacco: Never   Vaping Use    Vaping status: Never Used   Substance and Sexual Activity    Alcohol use: Not Currently     Comment: weekly    Drug use: Not Currently     Types: Cannabis, Cocaine, Nitrous oxide, LSD, Ecstasy     Comment: last use for all: March 2019    Sexual activity: Not Currently           REVIEW OF SYSTEMS:     10 point ROS completed and was negative unless stated in HPI.      EXAM:     Left lower extremity focused exam:  GENERAL: well developed, well nourished, in no apparent distress  EXTREMITIES:  1. Integument: Skin appears moist, warm, and supple with positive hair growth. There are no color changes. No open lesions. No macerations. No Hyperkeratotic lesions.   2. Vascular: Dorsalis pedis 2/4 and posterior tibial pulse 2/4, capillary refill normal.  3. Neurological: Gross sensation intact via light touch bilaterally.  Normal sharp/dull sensation  4. Musculoskeletal: All muscle groups are graded 5/5 in the foot and ankle with no pain elicited in any direction against resistance.  Patient does have pain with palpation to the peroneal tendons just posterior to the lateral malleolus.  No pain with palpation to fifth metatarsal  base.  Patient has no pain with palpation to anterior lateral ankle joint near the lateral ankle ligament complex.  Negative anterior drawer and negative talar tilt.  Patient does have adequate eversion strength with no concerns of peroneal tendon rupture.     XR ANKLE (MIN 3 VIEWS), LEFT (CPT=73610)    Result Date: 11/17/2024  CONCLUSION:  No acute osseous findings.   [facility]ATION:  [facility]   Dictated by (CST): Ha Reyes MD on 11/17/2024 at 1:44 PM     Finalized by (CST): Ha Reyes MD on 11/17/2024 at 1:44 PM          ASSESSMENT AND PLAN:   Diagnoses and all orders for this visit:    Left ankle injury, initial encounter  -     OP REFERRAL TO [facility]    Peroneal tendinitis, left  -     OP REFERRAL TO [facility]    Ankle stiffness, left    Acute left ankle pain  -     OP REFERRAL TO [facility]    Edema of left ankle  -     OP REFERRAL TO [facility]        Plan:   -Patient was seen and evaluated today in clinic.  Chart history reviewed.  Reviewed left ankle radiographs, revealing no acute osseous abnormalities    Discussed findings, which are consistent with peroneal tendinitis of the left lower extremity  Discussed conservative management and potential for formal PT.  Discussed possible oral steroids if patient is not diabetic, otherwise use of NSAIDS if no history of GERD or stomach upset.  Recommend cryotherapy/icing.  Discussed the importance of rest and the need for immobilization.  Recommend use of OTC and/or custom orthotics in the treatment and future prevention of tendinitis.  Supportive shoe gear recommendation was also given to patient today.  Custom orthotics are medically necessary to support and off-load the forces leading to the pathology and future prevention of further tendon tear and deformity.  Recommend use of compression stockings vs. ACE wrap to control edema/swelling.  Discussed potential need to order  MRI if conservative management fails to resolve symptoms out of concern for underlying tear.  Discussed potential need for surgical intervention if conservative management fails to resolve symptoms.    Patient provided with a compression ankle sleeve today.    Patient does elect to move forward with formal physical therapy.  A referral was placed today.    -All of the patient's questions and concerns were addressed.  They indicated their understanding of these issues and agrees to the plan.    RTC 4 to 6 weeks      Kelvin Yi DPM        [address] [address]   [email or web address]            [employer] speech recognition software was used to prepare this note.  Errors in word recognition may occur.  Please contact me with any questions/concerns with this note.

## 2024-12-13 NOTE — TELEPHONE ENCOUNTER
Patient is at her appointment at [facility] and she forgot to bring the order. Please fax Order right away to physical therapy to fax # [phone].

## 2025-01-09 NOTE — PROGRESS NOTES
[facility]  Progress Note      Suellen Mendoza is a 34 year old adult.   Chief Complaint   Patient presents with    Ankle Pain     Patient is here to follow up on left ankle. She has started physical therapy with success. If and when she has pain she rates it 3/10.         HPI:     Patient is a pleasant 34-year-old who is returning to clinic today for recheck on left ankle.  Patient states that they are doing well overall.  Has noticed improvements after starting physical therapy.  Does continue to have intermittent pain, rating that pain 3/10.  Has noticed both improvements in pain and stiffness overall.  Does get occasional discomfort depending on the position of the foot and activity level denies recent injuries or other concerns at this time.      Allergies: Seasonal   Current Outpatient Medications   Medication Sig Dispense Refill    ergocalciferol 1.25 MG (23466 UT) Oral Cap Take 1 capsule (50,000 Units total) by mouth once a week.      fluticasone-salmeterol (ADVAIR HFA) 115-21 MCG/ACT Inhalation Aerosol Inhale 1 puff into the lungs 2 (two) times daily. 3 Inhaler 1    Sertraline HCl 100 MG Oral Tab Take 2 tablets (200 mg total) by mouth daily. 60 tablet 0    DAILY MULTIPLE VITAMINS/IRON Oral Tab Take 1 tablet by mouth daily.      Albuterol Sulfate HFA (PROAIR HFA) 108 (90 Base) MCG/ACT Inhalation Aero Soln Inhale 2 puffs into the lungs every 6 (six) hours as needed for Wheezing. 8 g 0    Fluticasone Propionate 50 MCG/ACT Nasal Suspension 2 sprays by Each Nare route daily. 1 Bottle 2      Past Medical History:    Anxiety state, unspecified    Asthma (HCC)    Benign positional vertigo    Depression    Eustachian tube dysfunction    Iron deficiency    Rhinitis, allergic    Severe recurrent major depression without psychotic features ([facility])    Syncope and collapse    Vitamin D deficiency      Past Surgical History:   Procedure Laterality Date    Colonoscopy N/A 10/22/2018    Procedure: COLONOSCOPY;   Surgeon: Quincy Calvin MD;  Location: [facility]      Family History   Problem Relation Age of Onset    No Known Problems Father     Lipids Mother     Hypertension Mother     Anxiety Mother     Bipolar Disorder Brother     Suicide History Maternal Grandfather         death by suicide 4 yrs ago    Endometriosis Other       Social History     Socioeconomic History    Marital status: Single   Tobacco Use    Smoking status: Never    Smokeless tobacco: Never   Vaping Use    Vaping status: Never Used   Substance and Sexual Activity    Alcohol use: Not Currently     Comment: weekly    Drug use: Not Currently     Types: Cannabis, Cocaine, Nitrous oxide, LSD, Ecstasy     Comment: last use for all: March 2019    Sexual activity: Not Currently           REVIEW OF SYSTEMS:     10 point ROS completed and was negative unless stated in HPI.      EXAM:     Left lower extremity focused exam:  GENERAL: well developed, well nourished, in no apparent distress  EXTREMITIES:  1. Integument: Skin appears moist, warm, and supple with positive hair growth. There are no color changes. No open lesions. No macerations. No Hyperkeratotic lesions.   2. Vascular: Dorsalis pedis 2/4 and posterior tibial pulse 2/4, capillary refill normal.  3. Neurological: Gross sensation intact via light touch bilaterally.  Normal sharp/dull sensation  4. Musculoskeletal: All muscle groups are graded 5/5 in the foot and ankle with no pain elicited in any direction against resistance.  Patient has no pain with palpation to the peroneal tendons.  No pain with palpation to fifth metatarsal base.  Patient has no pain with palpation to anterior lateral ankle joint near the lateral ankle ligament complex.  Negative anterior drawer and negative talar tilt.  Patient does have adequate eversion strength with no concerns of peroneal tendon rupture.       ASSESSMENT AND PLAN:   Diagnoses and all orders for this visit:    Left ankle injury, subsequent encounter    Sprain of  left ankle, unspecified ligament, subsequent encounter    Peroneal tendinitis, left    Ankle stiffness, left    Acute left ankle pain        Plan:   -Patient was seen and evaluated today in clinic.  Chart history reviewed.    Overall improvements noted on clinical exam today, which was benign.  Patient continues to experience intermittent pain depending on activity level and position of the foot.    Recommending patient finish out physical therapy, performing exercises at home on a daily basis as well.    Patient should transition to all activities as tolerated.  Recommend use of supportive shoe gear at all times and avoid barefoot walking.  Patient should avoid any activities that elicits discomfort.    Encouraged patient to follow-up in the future if symptoms worsen or any other concerns arise    -All of the patient's questions and concerns were addressed.  They indicated their understanding of these issues and agrees to the plan.    Time spent reviewing pertinent information from patient's chart, reviewing any pertinent imaging, obtaining history and physical exam, discussing and mutually agreeing on a treatment plan, and documenting encounter: 15 minutes    RTC 4-6 weeks    Kelvin Yi DPM        1/8/2025    [facility]  [address] [place] [id number]   [email or web address]            [email or web address] speech recognition software was used to prepare this note.  Errors in word recognition may occur.  Please contact me with any questions/concerns with this note.

## 2025-01-14 NOTE — PROGRESS NOTES
CHIEF COMPLAINT:     Chief Complaint   Patient presents with    Sinus Problem     C/o eyes watery, runny nose, facial pain, sinus pressure, teeth pain  Sx onset last night  Denies cough, fever   OTC Sinus rinse, decongestant   No recent Covid test        HPI:   Suellen Mendoza is a 34 year old adult who presents for runny nose, sinus congestion, sinus pressure starting yesterday. Feeling some improvement today. No fever or cough. Treating with advil cold and sinus and otc sinus rinse.     Current Outpatient Medications   Medication Sig Dispense Refill    ergocalciferol 1.25 MG (53428 UT) Oral Cap Take 1 capsule (50,000 Units total) by mouth once a week.      fluticasone-salmeterol (ADVAIR HFA) 115-21 MCG/ACT Inhalation Aerosol Inhale 1 puff into the lungs 2 (two) times daily. 3 Inhaler 1    Sertraline HCl 100 MG Oral Tab Take 2 tablets (200 mg total) by mouth daily. 60 tablet 0    DAILY MULTIPLE VITAMINS/IRON Oral Tab Take 1 tablet by mouth daily.      Albuterol Sulfate HFA (PROAIR HFA) 108 (90 Base) MCG/ACT Inhalation Aero Soln Inhale 2 puffs into the lungs every 6 (six) hours as needed for Wheezing. 8 g 0    Fluticasone Propionate 50 MCG/ACT Nasal Suspension 2 sprays by Each Nare route daily. 1 Bottle 2      Past Medical History:    Anxiety state, unspecified    Asthma (HCC)    Benign positional vertigo    Depression    Eustachian tube dysfunction    Iron deficiency    Rhinitis, allergic    Severe recurrent major depression without psychotic features (HCC)    Syncope and collapse    Vitamin D deficiency      Past Surgical History:   Procedure Laterality Date    Colonoscopy N/A 10/22/2018    Procedure: COLONOSCOPY;  Surgeon: Quincy Calvin MD;  Location:  ENDOSCOPY         Social History     Socioeconomic History    Marital status: Single   Tobacco Use    Smoking status: Never    Smokeless tobacco: Never   Vaping Use    Vaping status: Never Used   Substance and Sexual Activity    Alcohol use: Not Currently      Comment: weekly    Drug use: Not Currently     Types: Cannabis, Cocaine, Nitrous oxide, LSD, Ecstasy     Comment: last use for all: March 2019    Sexual activity: Not Currently         REVIEW OF SYSTEMS:   GENERAL: normal appetite. No fever  SKIN: no rashes or abnormal skin lesions  HEENT: See HPI  LUNGS: denies shortness of breath or wheezing, See HPI  CARDIOVASCULAR: denies chest pain or palpitations   GI: denies N/V/C or abdominal pain  NEURO: Denies dizziness or weakness    EXAM:   /72   Pulse 98   Temp 98.7 °F (37.1 °C) (Oral)   Resp 16   Ht 5' 9\" (1.753 m)   Wt 164 lb (74.4 kg)   LMP 12/25/2024 (Approximate)   SpO2 98%   BMI 24.22 kg/m²   GENERAL: well developed, well nourished,in no apparent distress  SKIN: no rashes,no suspicious lesions  HEAD: atraumatic, normocephalic.    EYES: conjunctiva clear, EOM intact  EARS: TM's gray, no bulging, no retraction,no fluid, bony landmarks visible  NOSE: Nostrils patent, clear nasal discharge, nasal mucosa erythematous   THROAT: Oral mucosa pink, moist. Posterior pharynx is non erythematous. no exudates.   NECK: Supple, non-tender  LUNGS: clear to auscultation bilaterally, no wheezes or rhonchi. Breathing is non labored.  CARDIO: RRR without murmur  EXTREMITIES: no cyanosis, clubbing or edema  LYMPH:  no lymphadenopathy.        ASSESSMENT AND PLAN:   Suellen Mendoza is a 34 year old adult who presents with upper respiratory symptoms that are consistent with    ASSESSMENT:   Encounter Diagnosis   Name Primary?    Viral URI Yes     COVID rapid pcr negative    PLAN:     Comfort care per pt instructions.   To follow up for any new or worsening symptoms or if not improving the next few days.       Meds & Refills for this Visit:  Requested Prescriptions      No prescriptions requested or ordered in this encounter       Risks, benefits, and side effects of medication explained and discussed.    Patient Instructions   Push fluids  Flonase  Humidifier  May continue  decongestant the next few days if needed  Follow up for new/ worsening symptoms or if not improving the next few days.     The patient indicates understanding of these issues and agrees to the plan.  The patient is asked to return if sx's persist or worsen.

## 2025-01-14 NOTE — PATIENT INSTRUCTIONS
Push fluids  Flonase  Humidifier  May continue decongestant the next few days if needed  Follow up for new/ worsening symptoms or if not improving the next few days.

## 2025-02-18 NOTE — PROGRESS NOTES
[facility]  Progress Note      Suellen Mendoza is a 34 year old adult.   Chief Complaint   Patient presents with    Ankle Pain     L ankle injury f/u- rates pain 0-4/10 on and off         HPI:     Patient is a pleasant 34-year-old who is returning to clinic today for recheck on left ankle.  Patient states that they are continuing to have some mild discomfort, mostly to the outside of the ankle.  They are in physical therapy and has about 3 weeks left.  Does admit to overall improvements, rating the pain anywhere from 0-4/10 and states that it is intermittent.  Increasing activities does cause some mild discomfort.  Points to the area of the peroneal tendons when asked where the pain is.  Continues to have intermittent edema as well.  Does have compression ankle sleeve on today, which has been beneficial.  Denying recent injury or other concerns at this time.      Allergies: Seasonal   Current Outpatient Medications   Medication Sig Dispense Refill    ergocalciferol 1.25 MG (75206 UT) Oral Cap Take 1 capsule (50,000 Units total) by mouth once a week.      fluticasone-salmeterol (ADVAIR HFA) 115-21 MCG/ACT Inhalation Aerosol Inhale 1 puff into the lungs 2 (two) times daily. 3 Inhaler 1    Sertraline HCl 100 MG Oral Tab Take 2 tablets (200 mg total) by mouth daily. 60 tablet 0    DAILY MULTIPLE VITAMINS/IRON Oral Tab Take 1 tablet by mouth daily.      Albuterol Sulfate HFA (PROAIR HFA) 108 (90 Base) MCG/ACT Inhalation Aero Soln Inhale 2 puffs into the lungs every 6 (six) hours as needed for Wheezing. 8 g 0    Fluticasone Propionate 50 MCG/ACT Nasal Suspension 2 sprays by Each Nare route daily. 1 Bottle 2      Past Medical History:    Anxiety state, unspecified    Asthma (HCC)    Benign positional vertigo    Depression    Eustachian tube dysfunction    Iron deficiency    Rhinitis, allergic    Severe recurrent major depression without psychotic features ([facility])    Syncope and collapse    Vitamin D deficiency       Past Surgical History:   Procedure Laterality Date    Colonoscopy N/A 10/22/2018    Procedure: COLONOSCOPY;  Surgeon: Quincy Calvin MD;  Location: [facility]      Family History   Problem Relation Age of Onset    No Known Problems Father     Lipids Mother     Hypertension Mother     Anxiety Mother     Bipolar Disorder Brother     Suicide History Maternal Grandfather         death by suicide 4 yrs ago    Endometriosis Other       Social History     Socioeconomic History    Marital status: Single   Tobacco Use    Smoking status: Never    Smokeless tobacco: Never   Vaping Use    Vaping status: Never Used   Substance and Sexual Activity    Alcohol use: Not Currently     Comment: weekly    Drug use: Not Currently     Types: Cannabis, Cocaine, Nitrous oxide, LSD, Ecstasy     Comment: last use for all: March 2019    Sexual activity: Not Currently           REVIEW OF SYSTEMS:     10 point ROS completed and was negative unless stated in HPI.      EXAM:     Left lower extremity focused exam:  GENERAL: well developed, well nourished, in no apparent distress  EXTREMITIES:  1. Integument: Skin appears moist, warm, and supple with positive hair growth. There are no color changes. No open lesions. No macerations. No Hyperkeratotic lesions.   2. Vascular: Dorsalis pedis 2/4 and posterior tibial pulse 2/4, capillary refill normal.  3. Neurological: Gross sensation intact via light touch bilaterally.  Normal sharp/dull sensation  4. Musculoskeletal: All muscle groups are graded 5/5 in the foot and ankle with no pain elicited in any direction against resistance.  Pain with palpation posterior to the lateral malleolus distally along the course of the peroneal tendons to just distal to the lateral malleolus.  Adequate eversion strength with no concerns of peroneal tendon rupture.  No pain with palpation to fifth metatarsal base.  Patient has no pain with palpation to anterior lateral ankle joint near the lateral ankle ligament  complex.  Negative anterior drawer and negative talar tilt.  Patient does have adequate eversion strength with no concerns of peroneal tendon rupture.       ASSESSMENT AND PLAN:   Diagnoses and all orders for this visit:    Peroneal tendinitis, left    Left ankle injury, subsequent encounter    Sprain of left ankle, unspecified ligament, subsequent encounter    Acute left ankle pain    Edema of left ankle        Plan:   -Patient was seen and evaluated today in clinic.  Chart history reviewed.    Overall improvements noted with only discomfort elicited today with palpation to distal segment of the peroneal tendons near the lateral malleolus.  No pain with activation of the peroneal tendons and no pain to the anterior lateral ankle ligaments    Recommending patient finish out physical therapy.  States that they have at least 3 more weeks left.  Okay to increase activity as tolerated.    Recommend continued use of supportive shoe gear at all times, as well as utilizing compression ankle sleeve as needed.    If symptoms persist/worsen following physical therapy, will consider obtaining an MRI.    -All of the patient's questions and concerns were addressed.  They indicated their understanding of these issues and agrees to the plan.      RTC 4 weeks    Kelvin Yi DPM, AACFAS        2/18/2025    [facility]  [address] [address] [address]   [email or web address]            [employer] speech recognition software was used to prepare this note.  Errors in word recognition may occur.  Please contact me with any questions/concerns with this note.

## 2025-03-28 NOTE — PROGRESS NOTES
[facility]  Progress Note      Suellen Mendoza is a 34 year old adult.   Chief Complaint   Patient presents with    Ankle Pain     L ankle f/u - rates pain 0-3/10 on and off         HPI:     Patient is a pleasant 34-year-old who is returning to clinic today for recheck on left ankle.  Patient states that they are noticing continued improvements with physical therapy.  States that pain is intermittently 0-3/10.  States the pain is usually on the outside and back of the ankle when allowing the foot to relax.  Patient has 2 more physical therapy sessions left and states that they have reached maximal benefit.  Denying any other concerns at this time is presenting today for further evaluation and care      Allergies: Seasonal   Current Outpatient Medications   Medication Sig Dispense Refill    ergocalciferol 1.25 MG (96389 UT) Oral Cap Take 1 capsule (50,000 Units total) by mouth once a week.      fluticasone-salmeterol (ADVAIR HFA) 115-21 MCG/ACT Inhalation Aerosol Inhale 1 puff into the lungs 2 (two) times daily. 3 Inhaler 1    Sertraline HCl 100 MG Oral Tab Take 2 tablets (200 mg total) by mouth daily. 60 tablet 0    DAILY MULTIPLE VITAMINS/IRON Oral Tab Take 1 tablet by mouth daily.      Albuterol Sulfate HFA (PROAIR HFA) 108 (90 Base) MCG/ACT Inhalation Aero Soln Inhale 2 puffs into the lungs every 6 (six) hours as needed for Wheezing. 8 g 0    Fluticasone Propionate 50 MCG/ACT Nasal Suspension 2 sprays by Each Nare route daily. 1 Bottle 2      Past Medical History:    Anxiety state, unspecified    Asthma (HCC)    Benign positional vertigo    Depression    Eustachian tube dysfunction    Iron deficiency    Rhinitis, allergic    Severe recurrent major depression without psychotic features (HCC)    Syncope and collapse    Vitamin D deficiency      Past Surgical History:   Procedure Laterality Date    Colonoscopy N/A 10/22/2018    Procedure: COLONOSCOPY;  Surgeon: Quincy Calvin MD;  Location:  ENDOSCOPY       Family History   Problem Relation Age of Onset    No Known Problems Father     Lipids Mother     Hypertension Mother     Anxiety Mother     Bipolar Disorder Brother     Suicide History Maternal Grandfather         death by suicide 4 yrs ago    Endometriosis Other       Social History     Socioeconomic History    Marital status: Single   Tobacco Use    Smoking status: Never    Smokeless tobacco: Never   Vaping Use    Vaping status: Never Used   Substance and Sexual Activity    Alcohol use: Not Currently     Comment: weekly    Drug use: Not Currently     Types: Cannabis, Cocaine, Nitrous oxide, LSD, Ecstasy     Comment: last use for all: March 2019    Sexual activity: Not Currently           REVIEW OF SYSTEMS:     10 point ROS completed and was negative unless stated in HPI.      EXAM:     Left lower extremity focused exam:  GENERAL: well developed, well nourished, in no apparent distress  EXTREMITIES:  1. Integument: Skin appears moist, warm, and supple with positive hair growth. There are no color changes. No open lesions. No macerations. No Hyperkeratotic lesions.   2. Vascular: Dorsalis pedis 2/4 and posterior tibial pulse 2/4, capillary refill normal.  3. Neurological: Gross sensation intact via light touch bilaterally.  Normal sharp/dull sensation  4. Musculoskeletal: All muscle groups are graded 5/5 in the foot and ankle with no pain elicited in any direction against resistance.  Very minimal pain with palpation posterior to the lateral malleolus distally along the course of the peroneal tendons to just distal to the lateral malleolus.  5/5 eversion strength with no pain or concerns of peroneal tendon rupture.  No pain with palpation to fifth metatarsal base.  Patient has no pain with palpation to anterior lateral ankle joint near the lateral ankle ligament complex.  Negative anterior drawer and negative talar tilt with no pain elicited.  Patient does have adequate eversion strength with no concerns of  peroneal tendon rupture.  Heel valgus noted bilaterally in RCSP       ASSESSMENT AND PLAN:   Diagnoses and all orders for this visit:    Peroneal tendinitis, left    Left ankle injury, subsequent encounter    Sprain of left ankle, unspecified ligament, subsequent encounter    Acute left ankle pain        Plan:   -Patient was seen and evaluated today in clinic.  Chart history reviewed.    Clinically, patient has improved and minimal pain elicited on exam today.    Discussed concerns of pinching type discomfort to posterior lateral ankle due to pronation.  Discussed overall added support.  Patient was provided with information on supportive over-the-counter inserts, as well as supportive shoe gear today.  Recommend against barefoot walking.    Once finishing up physical therapy, recommend patient continue with stretching and strengthening exercises as needed.    Advised patient to contact my office and follow-up symptoms persist or worsen    -All of the patient's questions and concerns were addressed.  They indicated their understanding of these issues and agrees to the plan.    Time spent reviewing pertinent information from patient's chart, reviewing any pertinent imaging, obtaining history and physical exam, discussing and mutually agreeing on a treatment plan, and documenting encounter: 12 minutes    RTC as needed    Kelvin Yi DPM, AACFAS        3/28/2025    [facility]  [address] [place] [id number]   [email or web address]            Dragon speech recognition software was used to prepare this note.  Errors in word recognition may occur.  Please contact me with any questions/concerns with this note.

## (undated) DEVICE — Device: Brand: DEFENDO AIR/WATER/SUCTION AND BIOPSY VALVE

## (undated) DEVICE — FILTERLINE NASAL ADULT O2/CO2

## (undated) DEVICE — GLOVE SURG SENSICARE SZ 7

## (undated) DEVICE — FORCEP BIOPSY RJ4 LG CAP W/ND

## (undated) DEVICE — 3M™ RED DOT™ MONITORING ELECTRODE WITH FOAM TAPE AND STICKY GEL, 50/BAG, 20/CASE, 72/PLT 2570: Brand: RED DOT™

## (undated) DEVICE — 1200CC GUARDIAN II: Brand: GUARDIAN

## (undated) DEVICE — ENDOSCOPY PACK UPPER: Brand: MEDLINE INDUSTRIES, INC.

## (undated) DEVICE — ENDOSCOPY PACK - LOWER: Brand: MEDLINE INDUSTRIES, INC.

## (undated) NOTE — LETTER
Audelia Benoit [address] [address]  [place], [address] [facility]    Consent for Operation  Date: __________________                                Time: _______________    1.  I authorize the performance upon Ha Person the following operation:  Procedure videotaped, the surgeon will obtain the original videotape. The hospital will not be responsible for storage or maintenance of this tape. 8. For the purpose of advancing medical education, I consent to the admittance of observers to the Operating Room.   9 IN.    Signature of Patient:   ___________________________    When the patient is a minor or mentally incompetent to give consent:  Signature of person authorized to consent for patient: ___________________________   Relationship to patient: ______________ anesthesiologist about these medicines may increase my risk of anesthetic complications. iv. If I am allergic to anything or have had a reaction to anesthesia before. 3. I understand how the anesthesia medicine will help me (benefits).   4. I understand t their questions. The patient or their representative has agreed to have anesthesia services.     _____________________________________________________________________________  Witness        Date   Time  I have verified that the signature is that of the krystian

## (undated) NOTE — LETTER
10/25/2018          Jarad Beckford  [id number] [facility] [id number]    Dear Laura Fallon,     Here are the biopsy/pathology findings from your recent EGD (upper endoscopy) and colonoscopy:     The biopsy/pathology findings from your upper endoscopy

## (undated) NOTE — ED AVS SNAPSHOT
Freda Skiff   MRN: [id number]    Department:  THE [facility] Emergency Department in [place]   Date of Visit:  2/25/2018           Disclosure     Insurance plans vary and the physician(s) referred by the ER may not be covered by your plan.  Please contac tell this physician (or your personal doctor if your instructions are to return to your personal doctor) about any new or lasting problems. The primary care or specialist physician will see patients referred from the [facility] Emergency Department.  Lester Boyer

## (undated) NOTE — LETTER
Date: 1/14/2025    Patient Name: Suellen Mendoza          To Whom it may concern:    This letter has been written at the patient's request. The above patient was seen at [facility] for treatment of a medical condition.    This patient should be excused from attending work 1/14/25-1/15/25.        Sincerely,    LOU Zapata